# Patient Record
Sex: FEMALE | Race: WHITE | NOT HISPANIC OR LATINO | URBAN - NONMETROPOLITAN AREA
[De-identification: names, ages, dates, MRNs, and addresses within clinical notes are randomized per-mention and may not be internally consistent; named-entity substitution may affect disease eponyms.]

---

## 2021-10-05 ENCOUNTER — LAB OUTSIDE AN ENCOUNTER (OUTPATIENT)
Dept: URBAN - NONMETROPOLITAN AREA CLINIC 2 | Facility: CLINIC | Age: 21
End: 2021-10-05

## 2021-10-05 ENCOUNTER — OFFICE VISIT (OUTPATIENT)
Dept: URBAN - NONMETROPOLITAN AREA CLINIC 2 | Facility: CLINIC | Age: 21
End: 2021-10-05
Payer: COMMERCIAL

## 2021-10-05 ENCOUNTER — WEB ENCOUNTER (OUTPATIENT)
Dept: URBAN - NONMETROPOLITAN AREA CLINIC 2 | Facility: CLINIC | Age: 21
End: 2021-10-05

## 2021-10-05 VITALS
TEMPERATURE: 97.7 F | SYSTOLIC BLOOD PRESSURE: 125 MMHG | HEIGHT: 65 IN | BODY MASS INDEX: 32.36 KG/M2 | HEART RATE: 91 BPM | DIASTOLIC BLOOD PRESSURE: 79 MMHG | WEIGHT: 194.2 LBS

## 2021-10-05 DIAGNOSIS — K62.5 RECTAL BLEEDING: ICD-10-CM

## 2021-10-05 DIAGNOSIS — Z80.0 FAMILY HISTORY OF COLON CANCER: ICD-10-CM

## 2021-10-05 DIAGNOSIS — Z83.71 FAMILY HISTORY OF COLONIC POLYPS: ICD-10-CM

## 2021-10-05 PROCEDURE — 99203 OFFICE O/P NEW LOW 30 MIN: CPT | Performed by: NURSE PRACTITIONER

## 2021-10-05 NOTE — PHYSICAL EXAM NECK/THYROID:
normal appearance, trachea midline, no deformities
Implemented All Fall with Harm Risk Interventions:  [place] to call system. Call bell, personal items and telephone within reach. Instruct patient to call for assistance. Room bathroom lighting operational. Non-slip footwear when patient is off stretcher. Physically safe environment: no spills, clutter or unnecessary equipment. Stretcher in lowest position, wheels locked, appropriate side rails in place. Provide visual cue, wrist band, yellow gown, etc. Monitor gait and stability. Monitor for mental status changes and reorient to person, place, and time. Review medications for side effects contributing to fall risk. Reinforce activity limits and safety measures with patient and family. Provide visual clues: red socks.

## 2021-10-05 NOTE — HPI-TODAY'S VISIT:
10/5/21 Aleena Zayas is a very pleasant 22 YO F who presents for first visit for rectal bleeding. She had blood diarrhea that began after lunch on 9/19. She had 4-5 bloody stools, actually with very little stool. She has significant urgency but no abdominal pain or rectal pain. The following day her stools returned to normal, 2 formed stools. Urgency resolved. She denies constipation prior to this. No history of hemorrhoids. She has never seen blood in her stools before. She is tolerating diet without any issues. Denies nausea, vomiting, weight loss. No fever or chills. She is feeling well.  Her mother had colon polyps at age 35 and has colonoscopies every year. She does have multiple family maternal family members with FAP. Her mother completed genetic testing that was negative. TG

## 2021-10-15 ENCOUNTER — OFFICE VISIT (OUTPATIENT)
Dept: URBAN - NONMETROPOLITAN AREA SURGERY CENTER 1 | Facility: SURGERY CENTER | Age: 21
End: 2021-10-15
Payer: COMMERCIAL

## 2021-10-15 DIAGNOSIS — K62.5 ANAL BLEEDING: ICD-10-CM

## 2021-10-15 PROCEDURE — 45378 DIAGNOSTIC COLONOSCOPY: CPT | Performed by: INTERNAL MEDICINE

## 2021-10-15 PROCEDURE — G8907 PT DOC NO EVENTS ON DISCHARG: HCPCS | Performed by: INTERNAL MEDICINE

## 2021-11-16 ENCOUNTER — OFFICE VISIT (OUTPATIENT)
Dept: URBAN - NONMETROPOLITAN AREA CLINIC 2 | Facility: CLINIC | Age: 21
End: 2021-11-16
Payer: COMMERCIAL

## 2021-11-16 ENCOUNTER — DASHBOARD ENCOUNTERS (OUTPATIENT)
Age: 21
End: 2021-11-16

## 2021-11-16 DIAGNOSIS — Z80.0 FAMILY HISTORY OF COLON CANCER: ICD-10-CM

## 2021-11-16 DIAGNOSIS — Z12.11 COLON CANCER SCREENING: ICD-10-CM

## 2021-11-16 DIAGNOSIS — K62.5 RECTAL BLEEDING: ICD-10-CM

## 2021-11-16 DIAGNOSIS — K64.8 INTERNAL HEMORRHOIDS: ICD-10-CM

## 2021-11-16 DIAGNOSIS — Z83.71 FAMILY HISTORY OF COLONIC POLYPS: ICD-10-CM

## 2021-11-16 PROBLEM — 12063002: Status: ACTIVE | Noted: 2021-11-16

## 2021-11-16 PROBLEM — 90458007: Status: ACTIVE | Noted: 2021-11-16

## 2021-11-16 PROBLEM — 305058001: Status: ACTIVE | Noted: 2021-11-16

## 2021-11-16 PROBLEM — 312824007: Status: ACTIVE | Noted: 2021-11-16

## 2021-11-16 PROCEDURE — 99213 OFFICE O/P EST LOW 20 MIN: CPT | Performed by: NURSE PRACTITIONER

## 2021-11-16 NOTE — HPI-TODAY'S VISIT:
10/5/21 Aleena Zayas is a very pleasant 22 YO F who presents for first visit for rectal bleeding. She had blood diarrhea that began after lunch on 9/19. She had 4-5 bloody stools, actually with very little stool. She has significant urgency but no abdominal pain or rectal pain. The following day her stools returned to normal, 2 formed stools. Urgency resolved. She denies constipation prior to this. No history of hemorrhoids. She has never seen blood in her stools before. She is tolerating diet without any issues. Denies nausea, vomiting, weight loss. No fever or chills. She is feeling well.  Her mother had colon polyps at age 35 and has colonoscopies every year. She does have multiple family maternal family members with FAP. Her mother completed genetic testing that was negative. TG  11/16/21 Patient presents for colonoscopy follow up 10/15/2021 with normal exam to the ileum except for nonbleeding internal hemorrhoids. No bleeding since her last appointment. Denies rectal pain, abdominal pain, constipation, diarrhea. Bowel movements are normal. No new complaints and overall is doing well. TG